# Patient Record
Sex: FEMALE | Race: WHITE | NOT HISPANIC OR LATINO
[De-identification: names, ages, dates, MRNs, and addresses within clinical notes are randomized per-mention and may not be internally consistent; named-entity substitution may affect disease eponyms.]

---

## 2017-02-15 ENCOUNTER — RECORD ABSTRACTING (OUTPATIENT)
Age: 42
End: 2017-02-15

## 2017-02-15 DIAGNOSIS — O00.90 UNSPECIFIED. ECTOPIC. PREGNANCY WITHOUT INTRAUTERINE PREGNANCY: ICD-10-CM

## 2017-02-15 DIAGNOSIS — R10.30 LOWER ABDOMINAL PAIN, UNSPECIFIED: ICD-10-CM

## 2017-02-15 DIAGNOSIS — Z87.42 PERSONAL HISTORY OF OTHER DISEASES OF THE FEMALE GENITAL TRACT: ICD-10-CM

## 2017-02-15 DIAGNOSIS — Z83.3 FAMILY HISTORY OF DIABETES MELLITUS: ICD-10-CM

## 2017-02-15 DIAGNOSIS — Z78.9 OTHER SPECIFIED HEALTH STATUS: ICD-10-CM

## 2017-03-11 ENCOUNTER — EMERGENCY (EMERGENCY)
Facility: HOSPITAL | Age: 42
LOS: 0 days | Discharge: HOME | End: 2017-03-11

## 2017-06-27 DIAGNOSIS — S39.012A STRAIN OF MUSCLE, FASCIA AND TENDON OF LOWER BACK, INITIAL ENCOUNTER: ICD-10-CM
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
## 2017-06-27 DIAGNOSIS — M54.9 DORSALGIA, UNSPECIFIED: ICD-10-CM
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
## 2017-09-19 ENCOUNTER — EMERGENCY (EMERGENCY)
Facility: HOSPITAL | Age: 42
LOS: 0 days | Discharge: HOME | End: 2017-09-20

## 2017-09-19 DIAGNOSIS — M54.12 RADICULOPATHY, CERVICAL REGION: ICD-10-CM

## 2017-09-19 DIAGNOSIS — R20.0 ANESTHESIA OF SKIN: ICD-10-CM

## 2017-09-27 ENCOUNTER — OUTPATIENT (OUTPATIENT)
Dept: OUTPATIENT SERVICES | Facility: HOSPITAL | Age: 42
LOS: 1 days | Discharge: HOME | End: 2017-09-27

## 2017-09-27 ENCOUNTER — APPOINTMENT (OUTPATIENT)
Dept: INTERNAL MEDICINE | Facility: CLINIC | Age: 42
End: 2017-09-27

## 2017-09-27 VITALS
HEART RATE: 72 BPM | SYSTOLIC BLOOD PRESSURE: 108 MMHG | WEIGHT: 126 LBS | DIASTOLIC BLOOD PRESSURE: 77 MMHG | HEIGHT: 60 IN | BODY MASS INDEX: 24.74 KG/M2

## 2017-09-27 DIAGNOSIS — R20.2 ANESTHESIA OF SKIN: ICD-10-CM

## 2017-09-27 DIAGNOSIS — M79.602 PAIN IN LEFT ARM: ICD-10-CM

## 2017-09-27 DIAGNOSIS — R20.0 ANESTHESIA OF SKIN: ICD-10-CM

## 2017-09-27 DIAGNOSIS — G44.209 TENSION-TYPE HEADACHE, UNSPECIFIED, NOT INTRACTABLE: ICD-10-CM

## 2017-09-27 DIAGNOSIS — R10.9 UNSPECIFIED ABDOMINAL PAIN: ICD-10-CM

## 2017-09-27 DIAGNOSIS — Z00.01 ENCOUNTER FOR GENERAL ADULT MEDICAL EXAMINATION WITH ABNORMAL FINDINGS: ICD-10-CM

## 2017-09-27 DIAGNOSIS — Z00.00 ENCOUNTER FOR GENERAL ADULT MEDICAL EXAMINATION W/OUT ABNORMAL FINDINGS: ICD-10-CM

## 2017-09-27 DIAGNOSIS — Z83.3 FAMILY HISTORY OF DIABETES MELLITUS: ICD-10-CM

## 2017-09-29 ENCOUNTER — OUTPATIENT (OUTPATIENT)
Dept: OUTPATIENT SERVICES | Facility: HOSPITAL | Age: 42
LOS: 1 days | Discharge: HOME | End: 2017-09-29

## 2017-10-13 ENCOUNTER — OUTPATIENT (OUTPATIENT)
Dept: OUTPATIENT SERVICES | Facility: HOSPITAL | Age: 42
LOS: 1 days | Discharge: HOME | End: 2017-10-13

## 2017-10-18 ENCOUNTER — RESULT REVIEW (OUTPATIENT)
Age: 42
End: 2017-10-18

## 2017-10-20 LAB
25(OH)D3 SERPL-MCNC: 28 NG/ML
CHOLEST SERPL-MCNC: 204 MG/DL
ESTIMATED AVERGAGE GLUCOSE (NORTH): 108 MG/DL
FERRITIN SERPL-MCNC: 19 NG/ML
HBA1C MFR BLD: 5.4 %
HDLC SERPL-MCNC: 45 MG/DL
HDLC SERPL: 4.5
LDLC SERPL DIRECT ASSAY-MCNC: 140 MG/DL
TRIGL SERPL-MCNC: 92 MG/DL
TSH SERPL DL<=0.005 MIU/L-ACNC: 1.1 UIU/ML
VITAMIN D2 SERPL-MCNC: <4 NG/ML
VITAMIN D3 SERPL-MCNC: 28 NG/ML
VLDLC SERPL-MCNC: 18 MG/DL

## 2018-01-22 ENCOUNTER — APPOINTMENT (OUTPATIENT)
Dept: INTERNAL MEDICINE | Facility: CLINIC | Age: 43
End: 2018-01-22

## 2018-01-22 LAB — CYTOLOGY CVX/VAG DOC THIN PREP: NORMAL

## 2018-02-21 ENCOUNTER — APPOINTMENT (OUTPATIENT)
Dept: OBGYN | Facility: CLINIC | Age: 43
End: 2018-02-21

## 2018-04-18 ENCOUNTER — APPOINTMENT (OUTPATIENT)
Dept: OBGYN | Facility: CLINIC | Age: 43
End: 2018-04-18

## 2019-05-09 ENCOUNTER — TRANSCRIPTION ENCOUNTER (OUTPATIENT)
Age: 44
End: 2019-05-09

## 2020-01-21 ENCOUNTER — TRANSCRIPTION ENCOUNTER (OUTPATIENT)
Age: 45
End: 2020-01-21

## 2022-02-03 ENCOUNTER — APPOINTMENT (OUTPATIENT)
Dept: OBGYN | Facility: CLINIC | Age: 47
End: 2022-02-03

## 2022-02-04 ENCOUNTER — APPOINTMENT (OUTPATIENT)
Dept: OBGYN | Facility: CLINIC | Age: 47
End: 2022-02-04
Payer: MEDICAID

## 2022-02-17 ENCOUNTER — LABORATORY RESULT (OUTPATIENT)
Age: 47
End: 2022-02-17

## 2022-02-18 ENCOUNTER — APPOINTMENT (OUTPATIENT)
Dept: OBGYN | Facility: CLINIC | Age: 47
End: 2022-02-18
Payer: MEDICAID

## 2022-02-18 VITALS
HEIGHT: 62 IN | OXYGEN SATURATION: 99 % | HEART RATE: 74 BPM | TEMPERATURE: 97.8 F | BODY MASS INDEX: 23.74 KG/M2 | DIASTOLIC BLOOD PRESSURE: 66 MMHG | WEIGHT: 129 LBS | SYSTOLIC BLOOD PRESSURE: 116 MMHG

## 2022-02-18 DIAGNOSIS — Z01.419 ENCOUNTER FOR GYNECOLOGICAL EXAMINATION (GENERAL) (ROUTINE) W/OUT ABNORMAL FINDINGS: ICD-10-CM

## 2022-02-18 PROCEDURE — 99396 PREV VISIT EST AGE 40-64: CPT

## 2022-02-18 PROCEDURE — 81003 URINALYSIS AUTO W/O SCOPE: CPT | Mod: QW

## 2022-02-18 NOTE — PROCEDURE
[Cervical Pap Smear] : cervical Pap smear [Liquid Base] : liquid base [GC Chlamydia Culture] : GC Chlamydia Culture [GC & Chlamydia via Pap] : GC & Chlamydia via Pap [Endometrial Biopsy] : Endometrial biopsy [Time out performed] : Pre-procedure time out performed.  Patient's name, date of birth and procedure confirmed. [Consent Obtained] : Consent obtained [Risks] : risks [Benefits] : benefits [Alternatives] : alternatives [Patient] : patient [Infection] : infection [Bleeding] : bleeding [Allergic Reaction] : allergic reaction [Uterine Perforation] : uterine perforation [Pain] : pain [Negative] : negative pregnancy test [Betadine] : Betadine [None] : none [Tenaculum] : Tenaculum [Anteverted] : anteverted [Sent to Pathology] : placed in buffered formalin and sent for pathology [Tolerated Well] : Patient tolerated the procedure well [No Complications] : No complications [de-identified] : HPV

## 2022-02-18 NOTE — REVIEW OF SYSTEMS
[Abn Vaginal bleeding] : abnormal vaginal bleeding [Negative] : Heme/Lymph [FreeTextEntry8] : spotting brwon after cycle

## 2022-02-18 NOTE — HISTORY OF PRESENT ILLNESS
[FreeTextEntry1] : pt presents for annual reports monthly cycles with some spotting dark brown after for 10days every month  [TextBox_4] : pt in office for annual exam

## 2022-02-18 NOTE — PLAN
[FreeTextEntry1] : annual/pap/cx/hpv\par emb done \par + bleeding small amounts thick and like mucous \par TVS\par mammo current and wnl \par hormone profile/cbc \par vitamins c/d/ca/feso4\par f/u results and rto 6mths/prn \par

## 2022-02-18 NOTE — PHYSICAL EXAM
[Appropriately responsive] : appropriately responsive [Alert] : alert [No Acute Distress] : no acute distress [Soft] : soft [Oriented x3] : oriented x3 [Examination Of The Breasts] : a normal appearance [No Masses] : no breast masses were palpable [Labia Majora] : normal [Labia Minora] : normal [Scant] : There was scant vaginal bleeding [Normal] : normal [Uterine Adnexae] : normal [FreeTextEntry7] : old c/s oumou healed well  [FreeTextEntry4] : small amount of dark red mucous type bleeding/spotting

## 2022-02-22 ENCOUNTER — LABORATORY RESULT (OUTPATIENT)
Age: 47
End: 2022-02-22

## 2022-03-24 ENCOUNTER — OUTPATIENT (OUTPATIENT)
Dept: OUTPATIENT SERVICES | Facility: HOSPITAL | Age: 47
LOS: 1 days | Discharge: HOME | End: 2022-03-24

## 2022-03-24 ENCOUNTER — APPOINTMENT (OUTPATIENT)
Dept: OBGYN | Facility: CLINIC | Age: 47
End: 2022-03-24
Payer: MEDICAID

## 2022-03-24 VITALS
HEIGHT: 62 IN | SYSTOLIC BLOOD PRESSURE: 122 MMHG | WEIGHT: 108 LBS | BODY MASS INDEX: 19.88 KG/M2 | DIASTOLIC BLOOD PRESSURE: 58 MMHG

## 2022-03-24 DIAGNOSIS — N92.6 IRREGULAR MENSTRUATION, UNSPECIFIED: ICD-10-CM

## 2022-03-24 PROCEDURE — 99214 OFFICE O/P EST MOD 30 MIN: CPT

## 2022-03-25 PROBLEM — N92.6 IRREGULAR BLEEDING: Status: ACTIVE | Noted: 2022-02-18

## 2022-03-28 NOTE — HISTORY OF PRESENT ILLNESS
[FreeTextEntry1] : 46yo P4 LMP 3/8/22 here for evaluation of irregular, prolonged menses and possible endometrial polyp on ultrasound. Pt reports regular menses, 3-4 days are heaviest, then she stops bleeding for about 2 days, then has about 10 days of brown discharge. Also endorses occasional postcoital bleeding. Been ongoing for several years\par \par PSH: 4x C/S, BTL, laparoscopic salpingectomy for ectopic\par FH DM in father, denies cancer history\par \par Sonogram report reviewed:\par 6mm endometrial thickeness w/ possible endocervical polyp

## 2022-03-28 NOTE — DISCUSSION/SUMMARY
[FreeTextEntry1] : 46yo, P4, AUB, possible endometrial/cervical polyp\par -discussed that EMB was non-diagnostic, can repeat vs perform hysteroscopy D&C to better visualize, diagnose, and treat\par -reviewed risks of procedure including bleeding, infection, uterine perforation/damage\par -all questions answered, pt agreeable to hysteroscopy/D&C\par -will schedule

## 2022-03-28 NOTE — PHYSICAL EXAM
[Chaperone Present] : A chaperone was present in the examining room during all aspects of the physical examination [Appropriately responsive] : appropriately responsive [Alert] : alert [No Acute Distress] : no acute distress [Labia Majora] : normal [Labia Minora] : normal [Normal] : normal [Uterine Adnexae] : normal

## 2023-01-02 ENCOUNTER — EMERGENCY (EMERGENCY)
Facility: HOSPITAL | Age: 48
LOS: 0 days | Discharge: HOME | End: 2023-01-03
Attending: EMERGENCY MEDICINE | Admitting: EMERGENCY MEDICINE
Payer: MEDICAID

## 2023-01-02 DIAGNOSIS — R07.9 CHEST PAIN, UNSPECIFIED: ICD-10-CM

## 2023-01-02 DIAGNOSIS — M79.602 PAIN IN LEFT ARM: ICD-10-CM

## 2023-01-02 DIAGNOSIS — R20.2 PARESTHESIA OF SKIN: ICD-10-CM

## 2023-01-02 DIAGNOSIS — Z20.822 CONTACT WITH AND (SUSPECTED) EXPOSURE TO COVID-19: ICD-10-CM

## 2023-01-02 PROCEDURE — 99285 EMERGENCY DEPT VISIT HI MDM: CPT

## 2023-01-03 VITALS
HEIGHT: 62 IN | WEIGHT: 130.07 LBS | HEART RATE: 70 BPM | SYSTOLIC BLOOD PRESSURE: 126 MMHG | RESPIRATION RATE: 18 BRPM | OXYGEN SATURATION: 100 % | DIASTOLIC BLOOD PRESSURE: 74 MMHG | TEMPERATURE: 97 F

## 2023-01-03 VITALS
OXYGEN SATURATION: 99 % | SYSTOLIC BLOOD PRESSURE: 105 MMHG | TEMPERATURE: 98 F | DIASTOLIC BLOOD PRESSURE: 64 MMHG | RESPIRATION RATE: 16 BRPM | HEART RATE: 64 BPM

## 2023-01-03 LAB
ALBUMIN SERPL ELPH-MCNC: 4 G/DL — SIGNIFICANT CHANGE UP (ref 3.5–5.2)
ALP SERPL-CCNC: 47 U/L — SIGNIFICANT CHANGE UP (ref 30–115)
ALT FLD-CCNC: 11 U/L — SIGNIFICANT CHANGE UP (ref 0–41)
ANION GAP SERPL CALC-SCNC: 11 MMOL/L — SIGNIFICANT CHANGE UP (ref 7–14)
AST SERPL-CCNC: 20 U/L — SIGNIFICANT CHANGE UP (ref 0–41)
BASOPHILS # BLD AUTO: 0.04 K/UL — SIGNIFICANT CHANGE UP (ref 0–0.2)
BASOPHILS NFR BLD AUTO: 0.6 % — SIGNIFICANT CHANGE UP (ref 0–1)
BILIRUB SERPL-MCNC: 0.3 MG/DL — SIGNIFICANT CHANGE UP (ref 0.2–1.2)
BUN SERPL-MCNC: 10 MG/DL — SIGNIFICANT CHANGE UP (ref 10–20)
CALCIUM SERPL-MCNC: 8.9 MG/DL — SIGNIFICANT CHANGE UP (ref 8.4–10.5)
CHLORIDE SERPL-SCNC: 104 MMOL/L — SIGNIFICANT CHANGE UP (ref 98–110)
CO2 SERPL-SCNC: 21 MMOL/L — SIGNIFICANT CHANGE UP (ref 17–32)
CREAT SERPL-MCNC: 0.6 MG/DL — LOW (ref 0.7–1.5)
EGFR: 112 ML/MIN/1.73M2 — SIGNIFICANT CHANGE UP
EOSINOPHIL # BLD AUTO: 0.2 K/UL — SIGNIFICANT CHANGE UP (ref 0–0.7)
EOSINOPHIL NFR BLD AUTO: 2.8 % — SIGNIFICANT CHANGE UP (ref 0–8)
GLUCOSE SERPL-MCNC: 91 MG/DL — SIGNIFICANT CHANGE UP (ref 70–99)
HCG SERPL QL: NEGATIVE — SIGNIFICANT CHANGE UP
HCT VFR BLD CALC: 35 % — LOW (ref 37–47)
HGB BLD-MCNC: 11.3 G/DL — LOW (ref 12–16)
IMM GRANULOCYTES NFR BLD AUTO: 0.6 % — HIGH (ref 0.1–0.3)
LYMPHOCYTES # BLD AUTO: 2.28 K/UL — SIGNIFICANT CHANGE UP (ref 1.2–3.4)
LYMPHOCYTES # BLD AUTO: 31.9 % — SIGNIFICANT CHANGE UP (ref 20.5–51.1)
MCHC RBC-ENTMCNC: 27.3 PG — SIGNIFICANT CHANGE UP (ref 27–31)
MCHC RBC-ENTMCNC: 32.3 G/DL — SIGNIFICANT CHANGE UP (ref 32–37)
MCV RBC AUTO: 84.5 FL — SIGNIFICANT CHANGE UP (ref 81–99)
MONOCYTES # BLD AUTO: 0.44 K/UL — SIGNIFICANT CHANGE UP (ref 0.1–0.6)
MONOCYTES NFR BLD AUTO: 6.2 % — SIGNIFICANT CHANGE UP (ref 1.7–9.3)
NEUTROPHILS # BLD AUTO: 4.15 K/UL — SIGNIFICANT CHANGE UP (ref 1.4–6.5)
NEUTROPHILS NFR BLD AUTO: 57.9 % — SIGNIFICANT CHANGE UP (ref 42.2–75.2)
NRBC # BLD: 0 /100 WBCS — SIGNIFICANT CHANGE UP (ref 0–0)
PLATELET # BLD AUTO: 279 K/UL — SIGNIFICANT CHANGE UP (ref 130–400)
POTASSIUM SERPL-MCNC: 4.1 MMOL/L — SIGNIFICANT CHANGE UP (ref 3.5–5)
POTASSIUM SERPL-SCNC: 4.1 MMOL/L — SIGNIFICANT CHANGE UP (ref 3.5–5)
PROT SERPL-MCNC: 6.9 G/DL — SIGNIFICANT CHANGE UP (ref 6–8)
RBC # BLD: 4.14 M/UL — LOW (ref 4.2–5.4)
RBC # FLD: 15.1 % — HIGH (ref 11.5–14.5)
SARS-COV-2 RNA SPEC QL NAA+PROBE: SIGNIFICANT CHANGE UP
SODIUM SERPL-SCNC: 136 MMOL/L — SIGNIFICANT CHANGE UP (ref 135–146)
TROPONIN T SERPL-MCNC: <0.01 NG/ML — SIGNIFICANT CHANGE UP
TROPONIN T SERPL-MCNC: <0.01 NG/ML — SIGNIFICANT CHANGE UP
WBC # BLD: 7.15 K/UL — SIGNIFICANT CHANGE UP (ref 4.8–10.8)
WBC # FLD AUTO: 7.15 K/UL — SIGNIFICANT CHANGE UP (ref 4.8–10.8)

## 2023-01-03 PROCEDURE — 93010 ELECTROCARDIOGRAM REPORT: CPT

## 2023-01-03 PROCEDURE — 99236 HOSP IP/OBS SAME DATE HI 85: CPT

## 2023-01-03 PROCEDURE — 71046 X-RAY EXAM CHEST 2 VIEWS: CPT | Mod: 26

## 2023-01-03 PROCEDURE — 75574 CT ANGIO HRT W/3D IMAGE: CPT | Mod: 26,MA

## 2023-01-03 RX ORDER — METOPROLOL TARTRATE 50 MG
100 TABLET ORAL ONCE
Refills: 0 | Status: COMPLETED | OUTPATIENT
Start: 2023-01-03 | End: 2023-01-03

## 2023-01-03 RX ADMIN — Medication 100 MILLIGRAM(S): at 06:50

## 2023-01-03 RX ADMIN — Medication 100 MILLIGRAM(S): at 07:47

## 2023-01-03 NOTE — ED CDU PROVIDER DISPOSITION NOTE - ATTENDING CONTRIBUTION TO CARE
I personally evaluated the patient. I reviewed the Resident’s or Physician Assistant’s note (as assigned above), and agree with the findings and plan except as documented in my note.  except as documented in my note.  47 year-old female no significant past medical history presented to the ED with 1 day of chest pain.  EKG with no ischemia or arrhythmia.  Troponin negative.  All diagnostic testing reviewed.  Patient to ED OU for continued monitoring and reassessment and CCTA. CAD RAD–0.  All results discussed with patient and patient given copies of all results.  Patient to follow-up with her PMD and given return instructions.

## 2023-01-03 NOTE — ED CDU PROVIDER DISPOSITION NOTE - PATIENT PORTAL LINK FT
You can access the FollowMyHealth Patient Portal offered by [facility] by registering at the following website: [email or web address]. By joining [employer]’s FollowMyHealth portal, you will also be able to view your health information using other applications (apps) compatible with our system.

## 2023-01-03 NOTE — ED PROVIDER NOTE - OBJECTIVE STATEMENT
47 years old female no significant history, family history of CAD presenting complaint chest pain rating down the left arm with tingling sensation tonight.  Patient reports off-and-on chest pain for many years but never had cardiac work-up in the past.  Reports her father had MI in mid 50s. patient otherwise denies exogenous hormone use/recent hospitalization/hx of DVT. denies recent illness/fever/chill/HA/dizziness/sob/abd pain/n/v/d/urinary sxs.

## 2023-01-03 NOTE — ED PROVIDER NOTE - CLINICAL SUMMARY MEDICAL DECISION MAKING FREE TEXT BOX
46 yo woman with atypical chest pain with unremarkable EKG and negative troponin.  Medically stable and agreeable to obs for workup for CAD.

## 2023-01-03 NOTE — ED CDU PROVIDER DISPOSITION NOTE - CLINICAL COURSE
47 year-old female no significant past medical history presented to the ED with 1 day of chest pain.  EKG with no ischemia or arrhythmia.  Troponin negative.  All diagnostic testing reviewed.  Patient to ED OU for continued monitoring and reassessment and CCTA. CAD RAD–0.  All results discussed with patient and patient given copies of all results.  Patient to follow-up with her PMD and given return instructions.

## 2023-01-03 NOTE — ED CDU PROVIDER INITIAL DAY NOTE - ATTENDING APP SHARED VISIT CONTRIBUTION OF CARE
I personally evaluated the patient. I reviewed the Resident’s or Physician Assistant’s note (as assigned above), and agree with the findings and plan except as documented in my note.  47 year-old female no significant past medical history presented to the ED with 1 day of chest pain.  EKG with no ischemia or arrhythmia.  Troponin negative.  All diagnostic testing reviewed.  Patient to ED OU for continued monitoring and reassessment and CCTA later today.

## 2023-01-03 NOTE — ED PROVIDER NOTE - NS ED ATTENDING STATEMENT MOD
This was a shared visit with the SHAUNA. I reviewed and verified the documentation and independently performed the documented:

## 2023-01-03 NOTE — ED CDU PROVIDER INITIAL DAY NOTE - CLINICAL SUMMARY MEDICAL DECISION MAKING FREE TEXT BOX
except as documented in my note.  47 year-old female no significant past medical history presented to the ED with 1 day of chest pain.  EKG with no ischemia or arrhythmia.  Troponin negative.  All diagnostic testing reviewed.  Patient to ED OU for continued monitoring and reassessment and CCTA later today. except as documented in my note.  47 year-old female no significant past medical history presented to the ED with 1 day of chest pain.  EKG with no ischemia or arrhythmia.  Troponin negative.  All diagnostic testing reviewed.  Patient to ED OU for continued monitoring and reassessment and CCTA later today.  Patient currently stable

## 2023-01-03 NOTE — ED PROVIDER NOTE - CONSIDERATION OF ADMISSION OBSERVATION
Consideration of Admission/Observation Patient is low risk chest pain and would be good candidate for CCTA.  Agreeable with obs placement for completion of ACS/CAD workup.

## 2023-01-03 NOTE — ED ADULT TRIAGE NOTE - CHIEF COMPLAINT QUOTE
Pt. complains of burning chest pain radiating to left arm and back. Pt. states that symptoms began last night.

## 2023-01-03 NOTE — ED PROVIDER NOTE - NSICDXFAMILYHX_GEN_ALL_CORE_FT
FAMILY HISTORY:  Father  Still living? Unknown  Family history of coronary artery disease, Age at diagnosis: 51-60